# Patient Record
Sex: MALE | NOT HISPANIC OR LATINO | URBAN - METROPOLITAN AREA
[De-identification: names, ages, dates, MRNs, and addresses within clinical notes are randomized per-mention and may not be internally consistent; named-entity substitution may affect disease eponyms.]

---

## 2017-08-30 ENCOUNTER — IMPORTED ENCOUNTER (OUTPATIENT)
Dept: URBAN - METROPOLITAN AREA CLINIC 1 | Facility: CLINIC | Age: 37
End: 2017-08-30

## 2017-08-30 PROBLEM — H11.153: Noted: 2017-08-30

## 2017-08-30 PROBLEM — H04.123: Noted: 2017-08-30

## 2017-08-30 PROCEDURE — 92015 DETERMINE REFRACTIVE STATE: CPT

## 2017-08-30 PROCEDURE — 92004 COMPRE OPH EXAM NEW PT 1/>: CPT

## 2017-08-30 NOTE — PATIENT DISCUSSION
1.  Dry Eyes OU -- Recommended to patient to use Artificial Tears BID OU (sample of Refresh Optive given) 2. Pinguecula OU -- Use of sunglasses when exposed to UV light and observation is recommended. MRX for glasses givenReturn for an appointment in 1 year 27 with Dr. Scooter Powers.

## 2017-09-07 ENCOUNTER — OFFICE VISIT (OUTPATIENT)
Dept: INTERNAL MEDICINE CLINIC | Age: 37
End: 2017-09-07

## 2017-09-07 VITALS
OXYGEN SATURATION: 98 % | TEMPERATURE: 98 F | SYSTOLIC BLOOD PRESSURE: 136 MMHG | WEIGHT: 165 LBS | HEIGHT: 63 IN | RESPIRATION RATE: 18 BRPM | HEART RATE: 66 BPM | BODY MASS INDEX: 29.23 KG/M2 | DIASTOLIC BLOOD PRESSURE: 91 MMHG

## 2017-09-07 DIAGNOSIS — M79.12 STERNOCLEIDOMASTOID MUSCLE TENDERNESS: Primary | ICD-10-CM

## 2017-09-07 RX ORDER — BACLOFEN 20 MG/1
20 TABLET ORAL
Qty: 90 TAB | Refills: 1 | Status: SHIPPED | OUTPATIENT
Start: 2017-09-07 | End: 2018-06-21

## 2017-09-07 NOTE — PROGRESS NOTES
FAMILY MEDICINE CLINIC NOTE    S: The patient presents for establishment of care. History of bifrontal headaches for the last few weeks related to spending excessive time looking at computer screens. He was seen in the emergency department and told this was related to eye strain. He has now been to optometry and got new prescription glasses which did not improve the headaches. Patient did note some relief of his headache over the weekend. No fever or cough. Current Outpatient Prescriptions on File Prior to Visit   Medication Sig Dispense Refill    butalbital-acetaminophen-caff (FIORICET) -40 mg per capsule Take 1 Cap by mouth every four (4) hours as needed for Headache. Max Daily Amount: 6 Caps. 10 Cap 0     No current facility-administered medications on file prior to visit. History reviewed. No pertinent past medical history. Social History     Social History    Marital status: UNKNOWN     Spouse name: N/A    Number of children: N/A    Years of education: N/A     Occupational History    Not on file. Social History Main Topics    Smoking status: Former Smoker    Smokeless tobacco: Never Used    Alcohol use No      Comment: social    Drug use: No    Sexual activity: Not on file     Other Topics Concern    Not on file     Social History Narrative       Family History   Problem Relation Age of Onset    Diabetes Mother        Review of Systems - Negative except as noted    O:  Visit Vitals    BP (!) 136/91 (BP 1 Location: Left arm, BP Patient Position: Sitting)    Pulse 66    Temp 98 °F (36.7 °C) (Oral)    Resp 18    Ht 5' 3\" (1.6 m)    Wt 165 lb (74.8 kg)    SpO2 98%    BMI 29.23 kg/m2     NAD, comfortable  Small spasms palpated in bilateral sternocleidomastoid muscles    42 y.o. male      ICD-10-CM ICD-9-CM    1. Sternocleidomastoid muscle tenderness M79.1 729.1 baclofen (LIORESAL) 20 mg tablet  Self massage and sternocleidomastoid stretches instructed.  Trigger point massage demonstrated to patient who verbalizes immediate relief of his current frontal headache.

## 2017-09-07 NOTE — PROGRESS NOTES
ROOM # 8    Dave Mccoy presents today for   Chief Complaint   Patient presents with    New Patient    Headache    ED Follow-up     [facility] - 8/29/17 note in encounter tab       Dave Mccoy preferred language for health care discussion is english/other. Is someone accompanying this pt? no    Is the patient using any DME equipment during OV? no    Depression Screening:  PHQ over the last two weeks 9/7/2017   Little interest or pleasure in doing things Not at all   Feeling down, depressed or hopeless Not at all   Total Score PHQ 2 0       Learning Assessment:  Learning Assessment 9/7/2017   PRIMARY LEARNER Patient   HIGHEST LEVEL OF EDUCATION - PRIMARY LEARNER  4 YEARS OF COLLEGE   BARRIERS PRIMARY LEARNER NONE   CO-LEARNER CAREGIVER No   PRIMARY LANGUAGE ENGLISH   LEARNER PREFERENCE PRIMARY READING   ANSWERED BY patient   RELATIONSHIP SELF       Abuse Screening:  Abuse Screening Questionnaire 9/7/2017   Do you ever feel afraid of your partner? N   Are you in a relationship with someone who physically or mentally threatens you? N   Is it safe for you to go home? Y       Health Maintenance reviewed and discussed per provider. Yes    Dave Mccoy is due for tdap, influenza. Please order/place referral if appropriate. Advance Directive:  1. Do you have an advance directive in place? Patient Reply: no    2. If not, would you like material regarding how to put one in place? Patient Reply: no    Coordination of Care:  1. Have you been to the ER, urgent care clinic since your last visit? Hospitalized since your last visit? Yes, [facility] 8/29/17 for headaches    2. Have you seen or consulted any other health care providers outside of the [address] [place] [address] since your last visit? Include any pap smears or colon screening. no    Please see Red banners under Allergies, Med rec, Immunizations to remove outside inquires. All correct information has been verified with patient and added to chart. Medication variance in dosage/sig per patient as follows: none  Medication's patient's would liked removed:  none

## 2017-09-07 NOTE — MR AVS SNAPSHOT
Visit Information Date & Time Provider Department Dept. Phone Encounter #  
 9/7/2017  1:[address] [phone] [id number] Upcoming Health Maintenance Date Due DTaP/Tdap/Td series (1 - Tdap) 7/1/1999 INFLUENZA AGE 9 TO ADULT 8/1/2017 Allergies as of 9/7/2017  Review Complete On: 9/7/2017 By: Jony Noe MD  
 No Known Allergies Current Immunizations  Never Reviewed No immunizations on file. Not reviewed this visit You Were Diagnosed With   
  
 Codes Comments Sternocleidomastoid muscle tenderness    -  Primary ICD-10-CM: M79.1 ICD-9-CM: 729.1 Vitals BP Pulse Temp Resp Height(growth percentile) Weight(growth percentile) (!) 136/91 (BP 1 Location: Left arm, BP Patient Position: Sitting) 66 98 °F (36.7 °C) (Oral) 18 5' 3\" (1.6 m) 165 lb (74.8 kg) SpO2 BMI Smoking Status 98% 29.23 kg/m2 Former Smoker Vitals History BMI and BSA Data Body Mass Index Body Surface Area  
 29.23 kg/m 2 1.82 m 2 Preferred Pharmacy Pharmacy Name Phone [id number]  [address] [phone] Your Updated Medication List  
  
   
This list is accurate as of: 9/7/17  2:11 PM.  Always use your most recent med list.  
  
  
  
  
 baclofen 20 mg tablet Commonly known as:  LIORESAL Take 1 Tab by mouth three (3) times daily as needed. butalbital-acetaminophen-caff -40 mg per capsule Commonly known as:  [employer] Take 1 Cap by mouth every four (4) hours as needed for Headache. Max Daily Amount: 6 Caps. Prescriptions Sent to Pharmacy Refills  
 baclofen (LIORESAL) 20 mg tablet 1 Sig: Take 1 Tab by mouth three (3) times daily as needed. Class: Normal  
 [email or web address] [place] [address] Ph #: [phone]  Route: Oral  
  
 Introducing [facility]! Janette Manuel introduces [employer] patient portal. Now you can access parts of your medical record, email your doctor's office, and request medication refills online. 1. In your internet browser, go to [email or web address] 2. Click on the First Time User? Click Here link in the Sign In box. You will see the New Member Sign Up page. 3. Enter your [employer] Access Code exactly as it appears below. You will not need to use this code after youve completed the sign-up process. If you do not sign up before the expiration date, you must request a new code. · [employer] Access Code: [email or web address] Expires: 11/27/2017  8:32 PM 
 
4. Enter the last four digits of your Social Security Number (xxxx) and Date of Birth (mm/dd/yyyy) as indicated and click Submit. You will be taken to the next sign-up page. 5. Create a [employer] ID. This will be your [employer] login ID and cannot be changed, so think of one that is secure and easy to remember. 6. Create a [employer] password. You can change your password at any time. 7. Enter your Password Reset Question and Answer. This can be used at a later time if you forget your password. 8. Enter your e-mail address. You will receive e-mail notification when new information is available in [address] 9. Click Sign Up. You can now view and download portions of your medical record. 10. Click the Download Summary menu link to download a portable copy of your medical information. If you have questions, please visit the Frequently Asked Questions section of the [employer] website. Remember, [employer] is NOT to be used for urgent needs. For medical emergencies, dial 911. Now available from your iPhone and Android! Please provide this summary of care documentation to your next provider. Your primary care clinician is listed as Juan Manuel Castrejon. If you have any questions after today's visit, please call [phone].

## 2018-10-26 ENCOUNTER — IMPORTED ENCOUNTER (OUTPATIENT)
Dept: URBAN - METROPOLITAN AREA CLINIC 1 | Facility: CLINIC | Age: 38
End: 2018-10-26

## 2018-10-26 PROBLEM — H04.123: Noted: 2018-10-26

## 2018-10-26 PROBLEM — H11.153: Noted: 2018-10-26

## 2018-10-26 PROCEDURE — 92014 COMPRE OPH EXAM EST PT 1/>: CPT

## 2018-10-26 PROCEDURE — 92015 DETERMINE REFRACTIVE STATE: CPT

## 2018-10-26 NOTE — PATIENT DISCUSSION
1.  Dry Eyes OU -- Recommended to patient to use Artificial Tears BID OU2. Pinguecula OU -- Use of sunglasses when exposed to UV light and observation is recommended. MRX for glasses givenReturn for an appointment in 1 year 27 with Dr. Juaquin Webster.

## 2019-09-20 ENCOUNTER — IMPORTED ENCOUNTER (OUTPATIENT)
Dept: URBAN - METROPOLITAN AREA CLINIC 1 | Facility: CLINIC | Age: 39
End: 2019-09-20

## 2019-09-20 PROBLEM — H04.123: Noted: 2019-09-20

## 2019-09-20 PROBLEM — H11.153: Noted: 2019-09-20

## 2019-09-20 PROCEDURE — 92015 DETERMINE REFRACTIVE STATE: CPT

## 2019-09-20 PROCEDURE — 92014 COMPRE OPH EXAM EST PT 1/>: CPT

## 2019-09-20 NOTE — PATIENT DISCUSSION
1.  Dry Eyes OU -- Recommend the frequent use of OTC AT's BID-QID OU Routinely. 2.  Pinguecula OU -- Use of sunglasses when exposed to UV light and observation is recommended. Finalized Glasses MRx was given to patient today. Return for an appointment in 1 YR for a 30 OU with Dr. Joni Schaumann.

## 2020-09-25 ENCOUNTER — IMPORTED ENCOUNTER (OUTPATIENT)
Dept: URBAN - METROPOLITAN AREA CLINIC 1 | Facility: CLINIC | Age: 40
End: 2020-09-25

## 2020-09-25 PROBLEM — H04.123: Noted: 2020-09-25

## 2020-09-25 PROBLEM — H11.153: Noted: 2020-09-25

## 2020-09-25 PROCEDURE — 92014 COMPRE OPH EXAM EST PT 1/>: CPT

## 2020-09-25 PROCEDURE — 92015 DETERMINE REFRACTIVE STATE: CPT

## 2020-09-25 NOTE — PATIENT DISCUSSION
1.  Dry Eyes OU -Recommend the frequent use of OTC AT's BID-QID OU Routinely. (Sample of Blink given)2. Pinguecula OU -- Use of sunglasses when exposed to UV light and observation is recommended. MRX for glasses given. Return for an appointment in 1 year 27 with Dr. Erick Alegre.

## 2021-09-24 ENCOUNTER — IMPORTED ENCOUNTER (OUTPATIENT)
Dept: URBAN - METROPOLITAN AREA CLINIC 1 | Facility: CLINIC | Age: 41
End: 2021-09-24

## 2021-09-24 PROBLEM — H11.153: Noted: 2021-09-24

## 2021-09-24 PROBLEM — H04.123: Noted: 2021-09-24

## 2021-09-24 PROCEDURE — 92015 DETERMINE REFRACTIVE STATE: CPT

## 2021-09-24 PROCEDURE — 92014 COMPRE OPH EXAM EST PT 1/>: CPT

## 2021-09-24 NOTE — PATIENT DISCUSSION
1.  Dry Eyes OU -- Cont ATs TID OU routinely (Sample of Blink given). Consider Xiidra/Restasis without improvement. 2.  Pinguecula OU -- Use of sunglasses when exposed to UV light and observation is recommended. MRx for glasses given to patient. Return for an appointment in 1 year 30/[facility] with Dr. Carvajal.

## 2022-04-02 ASSESSMENT — VISUAL ACUITY
OD_SC: 20/20
OS_CC: J1+
OS_CC: J1+
OS_SC: 20/20
OS_SC: 20/25
OD_CC: J1+
OD_SC: 20/20
OS_SC: 20/20
OD_SC: 20/20-2
OS_CC: J1
OD_SC: 20/20
OD_CC: J1
OS_SC: 20/20
OS_CC: J1

## 2022-04-02 ASSESSMENT — TONOMETRY
OS_IOP_MMHG: 15
OS_IOP_MMHG: 15
OS_IOP_MMHG: 14
OS_IOP_MMHG: 15
OD_IOP_MMHG: 14
OD_IOP_MMHG: 15
OD_IOP_MMHG: 15
OD_IOP_MMHG: 14
OS_IOP_MMHG: 15
OD_IOP_MMHG: 15

## 2022-10-07 ENCOUNTER — COMPREHENSIVE EXAM (OUTPATIENT)
Dept: URBAN - METROPOLITAN AREA CLINIC 2 | Facility: CLINIC | Age: 42
End: 2022-10-07

## 2022-10-07 DIAGNOSIS — H52.4: ICD-10-CM

## 2022-10-07 DIAGNOSIS — H52.03: ICD-10-CM

## 2022-10-07 DIAGNOSIS — Q14.1: ICD-10-CM

## 2022-10-07 DIAGNOSIS — H11.153: ICD-10-CM

## 2022-10-07 DIAGNOSIS — H04.123: ICD-10-CM

## 2022-10-07 PROCEDURE — 92015 DETERMINE REFRACTIVE STATE: CPT

## 2022-10-07 PROCEDURE — 99214 OFFICE O/P EST MOD 30 MIN: CPT

## 2022-10-07 ASSESSMENT — TONOMETRY
OS_IOP_MMHG: 13
OD_IOP_MMHG: 14

## 2022-10-07 ASSESSMENT — VISUAL ACUITY
OS_CC: 20/20
OD_CC: 20/20
OU_CC: J1+

## 2023-10-13 ENCOUNTER — COMPREHENSIVE EXAM (OUTPATIENT)
Dept: URBAN - METROPOLITAN AREA CLINIC 2 | Facility: CLINIC | Age: 43
End: 2023-10-13

## 2023-10-13 DIAGNOSIS — Q14.1: ICD-10-CM

## 2023-10-13 DIAGNOSIS — H04.123: ICD-10-CM

## 2023-10-13 PROCEDURE — 92015 DETERMINE REFRACTIVE STATE: CPT

## 2023-10-13 PROCEDURE — 92014 COMPRE OPH EXAM EST PT 1/>: CPT

## 2023-10-13 ASSESSMENT — VISUAL ACUITY
OD_CC: J1
OS_CC: J1
OD_CC: 20/20
OS_CC: 20/20

## 2023-10-13 ASSESSMENT — TONOMETRY
OD_IOP_MMHG: 14
OS_IOP_MMHG: 14

## 2024-11-22 ENCOUNTER — COMPREHENSIVE EXAM (OUTPATIENT)
Dept: URBAN - METROPOLITAN AREA CLINIC 2 | Facility: CLINIC | Age: 44
End: 2024-11-22

## 2024-11-22 DIAGNOSIS — H52.4: ICD-10-CM

## 2024-11-22 DIAGNOSIS — H11.153: ICD-10-CM

## 2024-11-22 DIAGNOSIS — H04.123: ICD-10-CM

## 2024-11-22 DIAGNOSIS — H52.03: ICD-10-CM

## 2024-11-22 DIAGNOSIS — Q14.1: ICD-10-CM

## 2024-11-22 PROCEDURE — 92015 DETERMINE REFRACTIVE STATE: CPT

## 2024-11-22 PROCEDURE — 92014 COMPRE OPH EXAM EST PT 1/>: CPT
